# Patient Record
Sex: FEMALE | HISPANIC OR LATINO | URBAN - METROPOLITAN AREA
[De-identification: names, ages, dates, MRNs, and addresses within clinical notes are randomized per-mention and may not be internally consistent; named-entity substitution may affect disease eponyms.]

---

## 2019-08-14 ENCOUNTER — OFFICE VISIT (OUTPATIENT)
Dept: URBAN - METROPOLITAN AREA CLINIC 48 | Facility: CLINIC | Age: 64
End: 2019-08-14
Payer: MEDICARE

## 2019-08-14 DIAGNOSIS — E11.3553 TYPE 2 DIABETES MELLITUS WITH STABLE PROLIFERATIVE DIABETIC RETINOPATHY, BILATERAL: Primary | ICD-10-CM

## 2019-08-14 PROCEDURE — 92014 COMPRE OPH EXAM EST PT 1/>: CPT | Performed by: OPTOMETRIST

## 2019-08-14 PROCEDURE — 92133 CPTRZD OPH DX IMG PST SGM ON: CPT | Performed by: OPTOMETRIST

## 2019-08-14 ASSESSMENT — INTRAOCULAR PRESSURE
OD: 25
OS: 23

## 2019-08-14 NOTE — IMPRESSION/PLAN
Impression: Open angle with borderline findings, low risk, bilateral: H40.013. Glaucoma suspect based on ONH cupping - relatively low risk of progression at this point due to healthy appearing disc margins overall, normotensive IOP, and grossly normal RNFL 360 on OCT. Plan: Monitor w/ repeat OCT ONH annually

## 2019-08-14 NOTE — IMPRESSION/PLAN
Impression: Type 2 diabetes mellitus with stable proliferative diabetic retinopathy, bilateral: Z26.1200. no changes from 2 years ago Plan: Signs of treated proliferative retinopathy found on today's examination. No current/new signs of neovascularization noted. No new retinal heme. Continue F/U with retina specialist as recommended. Discussed importance of blood sugar, blood pressure and cholesterol control. Pt to RTC PRN with any change in visual status. Discussed retinal healing lag time from start of good A1C control for up to 2 years. Letter with results of today's examination sent to managing provider. 

RTC 1 year DFE

## 2020-08-24 ENCOUNTER — OFFICE VISIT (OUTPATIENT)
Dept: URBAN - METROPOLITAN AREA CLINIC 48 | Facility: CLINIC | Age: 65
End: 2020-08-24
Payer: COMMERCIAL

## 2020-08-24 PROCEDURE — 92014 COMPRE OPH EXAM EST PT 1/>: CPT | Performed by: OPTOMETRIST

## 2020-08-24 PROCEDURE — 92133 CPTRZD OPH DX IMG PST SGM ON: CPT | Performed by: OPTOMETRIST

## 2020-08-24 ASSESSMENT — INTRAOCULAR PRESSURE
OS: 22
OD: 26

## 2020-08-24 NOTE — ASSESSMENT/PLAN
Impression: Diagnostic Test - OCT: Good-Ave RNFL 83, stable Plan: Discuss with patient, nature of disease and associated risk. 

RTC 2-3 weeks IOP, OCT Mac, 5 line raster, pachs, MRx

## 2020-08-24 NOTE — IMPRESSION/PLAN
Impression: Open angle with borderline findings, low risk, bilateral: H40.013. elevated IOP today Plan: Discuss with patient, nature of disease and associated risk. 

RTC 2-3 weeks IOP, OCT Mac, 5 line raster, pachs, MRX

## 2020-08-24 NOTE — IMPRESSION/PLAN
Impression: Type 2 diabetes mellitus with stable proliferative diabetic retinopathy, bilateral: P75.1613.
no active retinopathy
decreased vision today  Plan: Discussed with patient the importance of glucose control and ocular risk. 

Follow up annually with dilated fundus exam, OCT Mac

## 2020-08-24 NOTE — ASSESSMENT/PLAN
Impression: Diagnostic Test - OCT: Good-Ave RNFL 99, stable Plan: Discuss with patient, nature of disease and associated risk. 

RTC 2-3 weeks IOP, OCT Mac, 5 line raster, pachs, MRx

## 2020-09-14 ENCOUNTER — OFFICE VISIT (OUTPATIENT)
Dept: URBAN - METROPOLITAN AREA CLINIC 48 | Facility: CLINIC | Age: 65
End: 2020-09-14
Payer: COMMERCIAL

## 2020-09-14 DIAGNOSIS — E11.3593 TYPE 2 DIAB WITH PROLIF DIAB RTNOP WITHOUT MACULAR EDEMA, BI: ICD-10-CM

## 2020-09-14 DIAGNOSIS — H40.013 OPEN ANGLE WITH BORDERLINE FINDINGS, LOW RISK, BILATERAL: Primary | ICD-10-CM

## 2020-09-14 PROCEDURE — 92012 INTRM OPH EXAM EST PATIENT: CPT | Performed by: OPTOMETRIST

## 2020-09-14 PROCEDURE — 92134 CPTRZ OPH DX IMG PST SGM RTA: CPT | Performed by: OPTOMETRIST

## 2020-09-14 ASSESSMENT — INTRAOCULAR PRESSURE
OD: 26
OS: 21

## 2020-09-14 NOTE — IMPRESSION/PLAN
Impression: Open angle with borderline findings, low risk, bilateral: H40.013. elevated IOP today
stable Macula per OCT-Mac Plan: Discuss with patient, nature of disease and associated risk. 

Cont IOP check, OCT-ON Q6 mo, 
OCT-Mac and DFE annually

## 2020-09-14 NOTE — ASSESSMENT/PLAN
Impression: OCT MAC - OD: Good-stable macula, atrophy; OS: Good-stable macula, atrophy Plan: stable RNFL OU per OCT-Mac

## 2021-12-27 ENCOUNTER — OFFICE VISIT (OUTPATIENT)
Dept: URBAN - METROPOLITAN AREA CLINIC 48 | Facility: CLINIC | Age: 66
End: 2021-12-27
Payer: COMMERCIAL

## 2021-12-27 DIAGNOSIS — H40.053 OCULAR HYPERTENSION, BILATERAL: ICD-10-CM

## 2021-12-27 PROCEDURE — 92004 COMPRE OPH EXAM NEW PT 1/>: CPT | Performed by: STUDENT IN AN ORGANIZED HEALTH CARE EDUCATION/TRAINING PROGRAM

## 2021-12-27 PROCEDURE — 92134 CPTRZ OPH DX IMG PST SGM RTA: CPT | Performed by: STUDENT IN AN ORGANIZED HEALTH CARE EDUCATION/TRAINING PROGRAM

## 2021-12-27 ASSESSMENT — INTRAOCULAR PRESSURE
OD: 23
OS: 17

## 2021-12-27 NOTE — IMPRESSION/PLAN
Impression: Ocular hypertension, bilateral: H40.053. May have component UGH syndrome though no uveitis or hyphema at this time. Plan: OD IOP slightly elevated, disc. options starting gtts. vs. monitoring w/out gtts. Patient elects to monitor w/out gtts.  



RTC  6 months for IOP with RNFL

## 2021-12-27 NOTE — IMPRESSION/PLAN
Impression: Type 2 diabetes mellitus with stable proliferative diabetic retinopathy, bilateral: T48.3199. Plan: Reviewed and disc.  OCT MAC stable atrophy OU. 
- Continue glucose, BP, and lipid control as per PCP

## 2022-06-27 NOTE — IMPRESSION/PLAN
Impression: Type 2 diabetes mellitus with stable proliferative diabetic retinopathy, bilateral: O05.2111.  Plan: continue to monitor yearly with Dr. Hazel Kitchen or Dr. Russel Vences

## 2022-06-27 NOTE — IMPRESSION/PLAN
Impression: Ocular hypertension, bilateral: H40.053. Plan: Discussed and reviewed diagnosis with patient today, understood by patient, Ocular Hypertension, intraocular pressure acceptable without medication. Continue without medication and observe, will continue to monitor. 

**Advised patient poor vision is due to advanced BDR damage, not glaucoma

## 2022-10-11 NOTE — IMPRESSION/PLAN
Impression: Ocular hypertension, bilateral: H40.053. **Advised patient poor vision is due to advanced BDR damage, not glaucoma Plan: Ocular Hypertension, intraocular pressure continues at 25, but acceptable without medication. Continue without medication and observe, will continue to monitor.